# Patient Record
Sex: MALE | Race: WHITE | URBAN - METROPOLITAN AREA
[De-identification: names, ages, dates, MRNs, and addresses within clinical notes are randomized per-mention and may not be internally consistent; named-entity substitution may affect disease eponyms.]

---

## 2018-08-27 ENCOUNTER — RADIANT APPOINTMENT (OUTPATIENT)
Dept: GENERAL RADIOLOGY | Facility: CLINIC | Age: 83
End: 2018-08-27
Attending: FAMILY MEDICINE
Payer: MEDICARE

## 2018-08-27 ENCOUNTER — OFFICE VISIT (OUTPATIENT)
Dept: URGENT CARE | Facility: URGENT CARE | Age: 83
End: 2018-08-27
Payer: MEDICARE

## 2018-08-27 VITALS
RESPIRATION RATE: 18 BRPM | WEIGHT: 212.6 LBS | DIASTOLIC BLOOD PRESSURE: 60 MMHG | SYSTOLIC BLOOD PRESSURE: 108 MMHG | OXYGEN SATURATION: 95 % | TEMPERATURE: 97.4 F | HEART RATE: 85 BPM

## 2018-08-27 DIAGNOSIS — M79.604 PAIN OF RIGHT LOWER EXTREMITY: ICD-10-CM

## 2018-08-27 DIAGNOSIS — M79.604 PAIN OF RIGHT LOWER EXTREMITY: Primary | ICD-10-CM

## 2018-08-27 PROCEDURE — 99214 OFFICE O/P EST MOD 30 MIN: CPT | Performed by: FAMILY MEDICINE

## 2018-08-27 PROCEDURE — 73630 X-RAY EXAM OF FOOT: CPT | Mod: RT

## 2018-08-27 RX ORDER — CEPHALEXIN 500 MG/1
500 CAPSULE ORAL 3 TIMES DAILY
Qty: 30 CAPSULE | Refills: 0 | Status: SHIPPED | OUTPATIENT
Start: 2018-08-27

## 2018-08-27 RX ORDER — FLUCONAZOLE 150 MG/1
150 TABLET ORAL WEEKLY
Qty: 4 TABLET | Refills: 0 | Status: SHIPPED | OUTPATIENT
Start: 2018-08-27

## 2018-08-27 RX ORDER — CLOTRIMAZOLE 1 %
CREAM (GRAM) TOPICAL 2 TIMES DAILY
Qty: 30 G | Refills: 1 | Status: SHIPPED | OUTPATIENT
Start: 2018-08-27

## 2018-08-27 NOTE — MR AVS SNAPSHOT
"              After Visit Summary   2018    Elver Solis    MRN: [id number]           Patient Information     Date Of Birth          2/15/1931        Visit Information        Provider Department      2018 1:50 PM Ruddy Diamond MD [facility]        Today's Diagnoses     Pain of right lower extremity    -  1       Follow-ups after your visit        Who to contact     If you have questions or need follow up information about today's clinic visit or your schedule please contact [facility] directly at [phone].  Normal or non-critical lab and imaging results will be communicated to you by Spotzothart, letter or phone within 4 business days after the clinic has received the results. If you do not hear from us within 7 days, please contact the clinic through Spotzothart or phone. If you have a critical or abnormal lab result, we will notify you by phone as soon as possible.  Submit refill requests through [employer]Sionics or call your pharmacy and they will forward the refill request to us. Please allow 3 business days for your refill to be completed.          Additional Information About Your Visit        MyChart Information     [employer] lets you send messages to your doctor, view your test results, renew your prescriptions, schedule appointments and more. To sign up, go to [email or web address] . Click on \"Log in\" on the left side of the screen, which will take you to the Welcome page. Then click on \"Sign up Now\" on the right side of the page.     You will be asked to enter the access code listed below, as well as some personal information. Please follow the directions to create your username and password.     Your access code is: [email or web address]  Expires: 2018  2:57 PM     Your access code will  in 90 days. If you need help or a new code, please call your [facility] clinic or [phone].        Care EveryWhere ID     This is your Care EveryWhere ID. " This could be used by other organizations to access your [facility] medical records  [id number]        Your Vitals Were     Pulse Temperature Respirations Pulse Oximetry          85 97.4  F (36.3  C) (Oral) 18 95%         Blood Pressure from Last 3 Encounters:   08/27/18 108/60   07/16/12 112/62   12/27/07 132/60    Weight from Last 3 Encounters:   08/27/18 212 lb 9.6 oz (96.4 kg)   07/16/12 218 lb (98.9 kg)   12/27/07 221 lb (100.2 kg)                 Today's Medication Changes          These changes are accurate as of 8/27/18  2:57 PM.  If you have any questions, ask your nurse or doctor.               Start taking these medicines.        Dose/Directions    acetaminophen-codeine 300-30 MG per tablet   Commonly known as:  TYLENOL #3   Used for:  Pain of right lower extremity   Started by:  Ruddy Diamond MD        Dose:  1 tablet   Take 1 tablet by mouth every 6 hours as needed for severe pain   Quantity:  10 tablet   Refills:  0       cephALEXin 500 MG capsule   Commonly known as:  KEFLEX   Used for:  Pain of right lower extremity   Started by:  Ruddy Diamond MD        Dose:  500 mg   Take 1 capsule (500 mg) by mouth 3 times daily   Quantity:  30 capsule   Refills:  0       clotrimazole 1 % cream   Commonly known as:  LOTRIMIN   Used for:  Pain of right lower extremity   Started by:  Ruddy Diamond MD        Apply topically 2 times daily   Quantity:  30 g   Refills:  1       fluconazole 150 MG tablet   Commonly known as:  DIFLUCAN   Used for:  Pain of right lower extremity   Started by:  Ruddy Diamond MD        Dose:  150 mg   Take 1 tablet (150 mg) by mouth once a week   Quantity:  4 tablet   Refills:  0            Where to get your medicines      These medications were sent to [facility] [address]  [address] [address]     Phone:  [phone]     cephALEXin 500 MG capsule    clotrimazole 1 % cream    fluconazole 150 MG tablet         Some of these  will need a paper prescription and others can be bought over the counter.  Ask your nurse if you have questions.     Bring a paper prescription for each of these medications     acetaminophen-codeine 300-30 MG per tablet               Information about OPIOIDS     PRESCRIPTION OPIOIDS: WHAT YOU NEED TO KNOW   We gave you an opioid (narcotic) pain medicine. It is important to manage your pain, but opioids are not always the best choice. You should first try all the other options your care team gave you. Take this medicine for as short a time (and as few doses) as possible.    Some activities can increase your pain, such as bandage changes or therapy sessions. It may help to take your pain medicine 30 to 60 minutes before these activities. Reduce your stress by getting enough sleep, working on hobbies you enjoy and practicing relaxation or meditation. Talk to your care team about ways to manage your pain beyond prescription opioids.    These medicines have risks:    DO NOT drive when on new or higher doses of pain medicine. These medicines can affect your alertness and reaction times, and you could be arrested for driving under the influence (DUI). If you need to use opioids long-term, talk to your care team about driving.    DO NOT operate heavy machinery    DO NOT do any other dangerous activities while taking these medicines.    DO NOT drink any alcohol while taking these medicines.     If the opioid prescribed includes acetaminophen, DO NOT take with any other medicines that contain acetaminophen. Read all labels carefully. Look for the word  acetaminophen  or  Tylenol.  Ask your pharmacist if you have questions or are unsure.    You can get addicted to pain medicines, especially if you have a history of addiction (chemical, alcohol or substance dependence). Talk to your care team about ways to reduce this risk.    All opioids tend to cause constipation. Drink plenty of water and eat foods that have a lot of  fiber, such as fruits, vegetables, prune juice, apple juice and high-fiber cereal. Take a laxative (Miralax, milk of magnesia, Colace, Senna) if you don t move your bowels at least every other day. Other side effects include upset stomach, sleepiness, dizziness, throwing up, tolerance (needing more of the medicine to have the same effect), physical dependence and slowed breathing.    Store your pills in a secure place, locked if possible. We will not replace any lost or stolen medicine. If you don t finish your medicine, please throw away (dispose) as directed by your pharmacist. The [place] [employer] has more information about safe disposal: [email or web address]         Primary Care Provider Fax #    Physician No Ref-Primary [phone]       No address on file        Equal Access to Services     HERNAN IZAGUIRRE : Jacey Mack, jorge alvarenga, fide haskins, bruce hannon . [facility] [phone].    ATENCIÓN: Si habla español, tiene a ridley disposición servicios gratuitos de asistencia lingüística. Justiname al [phone].    We comply with applicable federal civil rights laws and [place] laws. We do not discriminate on the basis of race, color, national origin, age, disability, sex, sexual orientation, or gender identity.            Thank you!     Thank you for choosing [facility]  for your care. Our goal is always to provide you with excellent care. Hearing back from our patients is one way we can continue to improve our services. Please take a few minutes to complete the written survey that you may receive in the mail after your visit with us. Thank you!             Your Updated Medication List - Protect others around you: Learn how to safely use, store and throw away your medicines at www.disposemymeds.org.          This list is accurate as of 8/27/18  2:57 PM.   Always use your most recent med list.                   Brand Name Dispense Instructions for use Diagnosis    acetaminophen 500 MG tablet    TYLENOL          acetaminophen-codeine 300-30 MG per tablet    TYLENOL #3    10 tablet    Take 1 tablet by mouth every 6 hours as needed for severe pain    Pain of right lower extremity       aspirin 325 MG EC tablet      1 tab po QD (Once per day)        CALCIUM 600 + D 600-200 MG-UNIT Tabs           cephALEXin 500 MG capsule    KEFLEX    30 capsule    Take 1 capsule (500 mg) by mouth 3 times daily    Pain of right lower extremity       clotrimazole 1 % cream    LOTRIMIN    30 g    Apply topically 2 times daily    Pain of right lower extremity       ELIQUIS PO      Take 5 mg by mouth        fluconazole 150 MG tablet    DIFLUCAN    4 tablet    Take 1 tablet (150 mg) by mouth once a week    Pain of right lower extremity       FUROSEMIDE PO      Take 20 mg by mouth        levofloxacin 500 MG tablet    LEVAQUIN    10 tablet    Take 1 tablet by mouth daily.    Pneumonia       LISINOPRIL PO      Take 20 mg by mouth        MAGNESIUM OXIDE PO           metFORMIN osmotic 1000 MG 24 hr tablet    FORTAMET    90    ONE TABLET DAILY    Type II or unspecified type diabetes mellitus without mention of complication, not stated as uncontrolled       METOPROLOL SUCCINATE PO           priLOSEC 20 MG CR capsule   Generic drug:  omeprazole     90    1 TAB PO QD (Once per day)    Esophageal reflux       simvastatin 40 MG tablet    ZOCOR    45    1/2 TABLET AT BEDTIME    Other and unspecified hyperlipidemia       SURESTEP PRO TEST STRIPS test strip   Generic drug:  blood glucose monitoring     100    test once daily as directed    Type II or unspecified type diabetes mellitus without mention of complication, not stated as uncontrolled

## 2018-08-27 NOTE — PROGRESS NOTES
SUBJECTIVE:  Elver Solis is a 87 year old male no injury to his foot but complaining of redness swelling and pain.  This seemed of started in the web of the first and second toe.  This seemed to increase over the last several days and now there is redness that goes on to the dorsum of his foot onto the great toe.  Wife says there is some flaking of the skin on the bottom of his foot.    There is no history of any injury that he is getting.  However he is having trouble walking.    Previous history of athlete's foot and consequences with the toes where he needed treatment in the past.    Sleeping a lot     OBJECTIVE:  Vital signs as noted above.  Appearance: in no apparent distress.  Foot/ankle exam: soft tissue swelling and tenderness at the first and second toe especially the web of the toe.  There is redness in between the toe with what appears to be some possible breakdown of the skin and this redness extends onto the dorsum and the lateral aspect of the great toe.  When you examine the plantar aspect there is some scaling of the skin.  X-ray: no fracture or dislocation noted. Cortex of bones looks good.    ASSESSMENT:  foot infection/ cellulitis; this is combination of fungal and bacterial infection.    No evidence of osteomyleitis on the xray.    PLAN:  Tylenol #3   Keflex  Diflucan weekly for 4 weeks.     Recheck 48-72 hours if not improved    Elevate legs  Rest    PCP in 2 weeks  clotrimazole  See orders in [facility].    25 minutes in exam and counseling . Greater than 50% of time in counseling in regards to infection and treatment.